# Patient Record
Sex: MALE | Race: ASIAN
[De-identification: names, ages, dates, MRNs, and addresses within clinical notes are randomized per-mention and may not be internally consistent; named-entity substitution may affect disease eponyms.]

---

## 2020-09-26 ENCOUNTER — HOSPITAL ENCOUNTER (EMERGENCY)
Dept: HOSPITAL 5 - ED | Age: 50
Discharge: HOME | End: 2020-09-26
Payer: SELF-PAY

## 2020-09-26 VITALS — SYSTOLIC BLOOD PRESSURE: 143 MMHG | DIASTOLIC BLOOD PRESSURE: 85 MMHG
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
## 2020-09-26 DIAGNOSIS — S09.90XA: Primary | ICD-10-CM
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
## 2020-09-26 DIAGNOSIS — Z79.899: ICD-10-CM
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
## 2020-09-26 PROCEDURE — 70450 CT HEAD/BRAIN W/O DYE: CPT

## 2020-09-26 NOTE — CAT SCAN REPORT
. CT head/brain wo con



INDICATION / CLINICAL INFORMATION:

50 years Male; MAIN.



TECHNIQUE: Routine CT head without contrast. All CT scans at this location are performed using CT dos
e reduction for ALARA by means of automated exposure control.



COMPARISON: 

None.



FINDINGS:



BRAIN / INTRACRANIAL CONTENTS: There is suggestion of an arachnoid cyst in the posterior fossa-of no 
clinical significance. No acute hemorrhage, mass effect, midline shift, hydrocephalus, or acute, larg
e territorial infarct. No chronic infarct or atrophy appreciated. No significant white matter abnorma
lity.



CRANIOCERVICAL JUNCTION: No significant abnormality.



ORBITS: No significant abnormality of visualized orbits.

SINUSES / MASTOIDS: No significant abnormality in the visualized paranasal sinuses or mastoid air felicia
ls.



ADDITIONAL FINDINGS: Minimal subcutaneous soft tissue swelling seen in the left frontal region. No si
gns of underlying calvarial fracture. 



IMPRESSION:

1. No focal mass, hemorrhage, hydrocephalus, or acute, large territorial infarct. 



Signer Name: Dieter Sue MD, III 

Signed: 9/26/2020 5:04 PM

Workstation Name: [id number]

## 2020-09-26 NOTE — EMERGENCY DEPARTMENT REPORT
ED Head Trauma HPI





- General


Chief complaint: Head Injury


Stated complaint: HEAD INJURY


Time Seen by Provider: 09/26/20 15:09


Source: patient, family


Mode of arrival: Ambulatory


Limitations: No Limitations





- History of Present Illness


Initial comments: 





Patient is a 52-year-old male who presents emergency room after an head injury 

that occurred just prior to arrival.  Patient states that a 3 pound cordless 

drill fell from 2 feet above him and hit him in the head.  He has associated 

headache.  He states that he also has a lump to the left side of his forehead.  

He states he was experiencing some mild dizziness but that has since improved.  

He denies any loss of consciousness, vision changes, numbness, weakness, bowel 

or bladder incontinence, neck pain, any other injury.  He states he has a past 

medical history of a clavicle surgery.  He denies any allergies to medications.





- Related Data


                                  Previous Rx's











 Medication  Instructions  Recorded  Last Taken  Type


 


Acetaminophen [Tylenol] 650 mg PO Q8HR PRN #14 capsule 09/26/20 Unknown Rx











Allergies/Adverse reactions: 


                                    Allergies











Allergy/AdvReac Type Severity Reaction Status Date / Time


 


No Known Allergies Allergy   Unverified 09/26/20 14:48














ED Review of Systems


ROS: 


Stated complaint: HEAD INJURY


Other details as noted in HPI





Comment: All other systems reviewed and negative





ED Past Medical Hx





- Past Medical History


Previous Medical History?: No





- Surgical History


Past Surgical History?: No





- Social History


Smoking Status: Never Smoker


Substance Use Type: None





- Medications


Home Medications: 


                                Home Medications











 Medication  Instructions  Recorded  Confirmed  Last Taken  Type


 


Acetaminophen [Tylenol] 650 mg PO Q8HR PRN #14 capsule 09/26/20  Unknown Rx














ED Physical Exam





- General


Limitations: No Limitations


General appearance: alert, in no apparent distress





- Head


Head exam: Present: other (2 cm hematoma present to the left forehead, mild ttp,

 no deformity, no crepitus, no other facial bone ttp, no abrasion or laceration)





- Eye


Eye exam: Present: normal appearance, PERRL, EOMI, other (no racoon eyes).  

Absent: periorbital swelling, periorbital tenderness


Pupils: Present: normal accommodation





- ENT


ENT exam: Present: mucous membranes moist, other (no arita signs)





- Neck


Neck exam: Present: normal inspection, full ROM.  Absent: tenderness





- Respiratory


Respiratory exam: Absent: respiratory distress, accessory muscle use





- Neurological Exam


Neurological exam: Present: alert, oriented X3, CN II-XII intact, normal gait.  

Absent: motor sensory deficit





- Psychiatric


Psychiatric exam: Present: normal affect, normal mood





- Skin


Skin exam: Present: warm, dry





ED Course


                                   Vital Signs











  09/26/20





  14:45


 


Temperature 98.3 F


 


Pulse Rate 79


 


Respiratory 14





Rate 


 


Blood Pressure 143/85


 


O2 Sat by Pulse 97





Oximetry 














- Radiology Data


Radiology results: report reviewed


 CT head/brain wo con 





INDICATION / CLINICAL INFORMATION: 


50 years Male; MAIN. 





TECHNIQUE: Routine CT head without contrast. All CT scans at this location are 

performed using CT 


 dose reduction for ALARA by means of automated exposure control. 





COMPARISON: 


None. 





FINDINGS: 





BRAIN / INTRACRANIAL CONTENTS: There is suggestion of an arachnoid cyst in the 

posterior fossa-of 


 no clinical significance. No acute hemorrhage, mass effect, midline shift, 

hydrocephalus, or acute, 


 large territorial infarct. No chronic infarct or atrophy appreciated. No 

significant white matter 


 abnormality. 





CRANIOCERVICAL JUNCTION: No significant abnormality. 





ORBITS: No significant abnormality of visualized orbits. 


SINUSES / MASTOIDS: No significant abnormality in the visualized paranasal 

sinuses or mastoid air 


 cells. 





ADDITIONAL FINDINGS: Minimal subcutaneous soft tissue swelling seen in the left 

frontal region. No 


 signs of underlying calvarial fracture. 





IMPRESSION: 


1. No focal mass, hemorrhage, hydrocephalus, or acute, large territorial 

infarct. 





Signer Name: Dieter Sue MD, III 


Signed: 9/26/2020 5:04 PM 


Workstation Name: [id number] 








 Transcribed By: HR 


 Dictated By: Dieter Sue MD 


 Electronically Authenticated By: Dieter Sue MD 


 Signed Date/Time: 09/26/20 1704 











 DD/DT: 09/26/20 1658 


 TD/TT: 





- Medical Decision Making





Patient is a 52-year-old male who presents emergency room after an head injury 

that occurred just prior to arrival.  Patient states that a 3 pound cordless 

drill fell from 2 feet above him and hit him in the head.  He has associated 

headache.  He states that he also has a lump to the left side of his forehead.  

He states he was experiencing some mild dizziness but that has since improved.  

He denies any loss of consciousness, vision changes, numbness, weakness, bowel 

or bladder incontinence, neck pain, any other injury.  He states he has a past 

medical history of a clavicle surgery.  He denies any allergies to medications. 

vitals are stable. on exam: 2 cm hematoma present to the left forehead, mild 

ttp, no deformity, no crepitus, no other facial bone ttp, no abrasion or 

laceration, no focal neuro deficit. CT head: 1. No focal mass, hemorrhage, 

hydrocephalus, or acute, large territorial infarct. pt given tylenol while in 

the ED and symptoms improved. pt given his CT report. pt given prescription for 

tylenol. advised pt Please take medication as prescribed as needed.  Follow-up 

with a primary care doctor.  Return to emergency room for any new or worsening 

symptoms.





- Differential Diagnosis


minor head injury, concussion, ICH, SDH, epidural hematoma, fracture





- NEXUS Criteria


Focal neurological deficit present: No


Midline spinal tenderness present: No


Altered level of consciousness: No


Intoxication present: No


Distracting injury present: No


NEXUS results: C-Spine can be cleared clinically by these results. Imaging is 

not required.


Critical care attestation.: 


If time is entered above; I have spent that time in minutes in the direct care 

of this critically ill patient, excluding procedure time.








ED Disposition


Clinical Impression: 


 Hematoma





Minor head injury without loss of consciousness


Qualifiers:


 Encounter type: initial encounter Qualified Code(s): S09.90XA - Unspecified 

injury of head, initial encounter





Disposition: DC-01 TO HOME OR SELFCARE


Is pt being admited?: No


Does the pt Need Aspirin: No


Condition: Stable


Instructions:  Minor Head Injury (ED)


Additional Instructions: 


Please take medication as prescribed as needed.  Follow-up with a primary care 

doctor.  Return to emergency room for any new or worsening symptoms.


Prescriptions: 


Acetaminophen [Tylenol] 650 mg PO Q8HR PRN #14 capsule


 PRN Reason: headache


Referrals: 


your, primary care doctor [Other] - 2-3 Days


JOCELYNE MARTIN MD [Staff Physician] - 2-3 Days


[facility] [Provider Group] - 2-3 Days


Time of Disposition: 17:10


Print Language: ENGLISH

## 2024-02-05 ENCOUNTER — LAB (OUTPATIENT)
Dept: URBAN - METROPOLITAN AREA CLINIC 52 | Facility: CLINIC | Age: 54
End: 2024-02-05

## 2024-02-05 ENCOUNTER — OV NP (OUTPATIENT)
Dept: URBAN - METROPOLITAN AREA CLINIC 52 | Facility: CLINIC | Age: 54
End: 2024-02-05
Payer: COMMERCIAL

## 2024-02-05 VITALS
HEART RATE: 92 BPM | DIASTOLIC BLOOD PRESSURE: 82 MMHG | BODY MASS INDEX: 26.06 KG/M2 | HEIGHT: 62 IN | OXYGEN SATURATION: 97 % | TEMPERATURE: 97.3 F | SYSTOLIC BLOOD PRESSURE: 161 MMHG | WEIGHT: 141.6 LBS

## 2024-02-05 DIAGNOSIS — Z86.010 HISTORY OF ADENOMATOUS POLYP OF COLON: ICD-10-CM

## 2024-02-05 PROBLEM — 429047008: Status: ACTIVE | Noted: 2024-02-05

## 2024-02-05 PROCEDURE — 99202 OFFICE O/P NEW SF 15 MIN: CPT | Performed by: INTERNAL MEDICINE

## 2024-02-05 RX ORDER — INSULIN ASPART 100 [IU]/ML
INJECT BY SUBCUTANEOUS ROUTE PER PRESCRIBER'S INSTRUCTIONS. INSULIN DOSING REQUIRES INDIVIDUALIZATION INJECTION, SOLUTION INTRAVENOUS; SUBCUTANEOUS
Qty: 1 | Refills: 0 | Status: ACTIVE | COMMUNITY
Start: 1900-01-01

## 2024-02-05 RX ORDER — LISINOPRIL 20 MG/1
TABLET ORAL
Qty: 0 | Refills: 0 | Status: ACTIVE | COMMUNITY
Start: 1900-01-01

## 2024-02-05 RX ORDER — SIMVASTATIN 40 MG/1
TABLET, FILM COATED ORAL
Qty: 0 | Refills: 0 | Status: ACTIVE | COMMUNITY
Start: 1900-01-01

## 2024-02-05 RX ORDER — POLYETHYLENE GLYCOL 3350, SODIUM CHLORIDE, SODIUM BICARBONATE, POTASSIUM CHLORIDE 420; 11.2; 5.72; 1.48 G/4L; G/4L; G/4L; G/4L
AS DIRECTED POWDER, FOR SOLUTION ORAL
Qty: 4000 | Refills: 0 | OUTPATIENT
Start: 2024-02-05 | End: 2024-02-06

## 2024-02-05 NOTE — HPI-TODAY'S VISIT:
[id number]. 3 polyps, all < 5 mm.  1 tubular adenoma, 1 inflammatory. 2/5/24-No abd pain, BRB, diarrhea, constipation. Family HX neg for colon.

## 2024-03-06 ENCOUNTER — LAB (OUTPATIENT)
Dept: URBAN - METROPOLITAN AREA CLINIC 4 | Facility: CLINIC | Age: 54
End: 2024-03-06
Payer: COMMERCIAL

## 2024-03-06 ENCOUNTER — COLON (OUTPATIENT)
Dept: URBAN - METROPOLITAN AREA SURGERY CENTER 17 | Facility: SURGERY CENTER | Age: 54
End: 2024-03-06
Payer: COMMERCIAL

## 2024-03-06 DIAGNOSIS — Z86.010 PERSONAL HISTORY OF COLONIC POLYPS: ICD-10-CM

## 2024-03-06 DIAGNOSIS — D12.5 BENIGN NEOPLASM OF SIGMOID COLON: ICD-10-CM

## 2024-03-06 PROCEDURE — 45385 COLONOSCOPY W/LESION REMOVAL: CPT | Performed by: INTERNAL MEDICINE

## 2024-03-06 PROCEDURE — 88305 TISSUE EXAM BY PATHOLOGIST: CPT | Performed by: PATHOLOGY
